# Patient Record
Sex: MALE | Race: WHITE | NOT HISPANIC OR LATINO | URBAN - NONMETROPOLITAN AREA
[De-identification: names, ages, dates, MRNs, and addresses within clinical notes are randomized per-mention and may not be internally consistent; named-entity substitution may affect disease eponyms.]

---

## 2019-07-26 ENCOUNTER — IMPORTED ENCOUNTER (OUTPATIENT)
Dept: URBAN - NONMETROPOLITAN AREA CLINIC 1 | Facility: CLINIC | Age: 64
End: 2019-07-26

## 2019-07-26 PROBLEM — H52.4: Noted: 2019-07-26

## 2019-07-26 PROBLEM — D23.11: Noted: 2019-07-26

## 2019-07-26 PROBLEM — H25.813: Noted: 2019-07-26

## 2019-07-26 PROBLEM — H52.223: Noted: 2019-07-26

## 2019-07-26 PROBLEM — H52.01: Noted: 2019-07-26

## 2019-07-26 PROCEDURE — 99203 OFFICE O/P NEW LOW 30 MIN: CPT

## 2019-07-26 PROCEDURE — 92015 DETERMINE REFRACTIVE STATE: CPT

## 2019-07-26 NOTE — PATIENT DISCUSSION
Cataract(s)-Visually significant.-Cataract(s) causing symptomatic impairment of visual function not correctable with a tolerable change in glasses or contact lenses lighting or non-operative means resulting in specific activity limitations and/or participation restrictions including but not limited to reading viewing television driving or meeting vocational or recreational needs. -Expectation is clearer vision and reduced glare disability after cataract removal.-Refer for cataract evaluationLid Lesion Removal-Risk include infection inflammation bleeding the need for more surgery. I have discussed the above procedure possible alternative methods of treatment and risk factors involved with the patient and/or family members. The patient wishes to proceed with lid lesion removal.Hyperopia-Discussed diagnosis with patient. Astigmatism-Discussed diagnosis with patient. Presbyopia-Discussed diagnosis with patient. Updated spec Rx given. Recommend lens that will provide comfort as well as protect safety and health of eyes. RTC 1 Yr CEE

## 2022-04-09 ASSESSMENT — VISUAL ACUITY
OS_PH: 20/25
OS_SC: 20/50
OD_SC: 20/25

## 2022-04-09 ASSESSMENT — TONOMETRY
OS_IOP_MMHG: 15
OD_IOP_MMHG: 16